# Patient Record
Sex: FEMALE | Race: WHITE | NOT HISPANIC OR LATINO | Employment: OTHER | URBAN - METROPOLITAN AREA
[De-identification: names, ages, dates, MRNs, and addresses within clinical notes are randomized per-mention and may not be internally consistent; named-entity substitution may affect disease eponyms.]

---

## 2017-06-07 NOTE — PROCEDURE NOTE: CLINICAL
PROCEDURE NOTE: Lucentis 0.5mg PFS OD. Diagnosis: Neovascular AMD with Active CNV. Anesthesia: Topical. Prep: Betadine Flush. Prior to injection, risks/benefits/alternatives discussed including but not limited to infection, loss of vision or eye, hemorrhage, cataract, glaucoma, retinal tears or detachment. The patient wished to proceed with treatment. Topical anesthesia was induced with Alcaine. Additional anesthesia was achieved using drop(s) or injection checked above. A drop of Povidone-iodine 5% ophthalmic solution was instilled over the injection site and in the inferior fornix. Betadine prep was performed. A single use prefilled syringe of intravitreal Lucentis 0.5mg/0.05ml was used and excess discarded. The needle was passed 3.0 mm posterior to the limbus in pseudophakic patients, and 3.5 mm posterior to the limbus in phakic patients. The remainder of the Lucentis 0.5mg in the single-use vial was then discarded in a medical waste disposal container. The eye was irrigated with sterile irrigating solution. Patient tolerated the procedure well. There were no complications. Post procedure instructions given. CF vision checked. Injection Time 2:50PM. The patient was instructed to return for re-evaluation in approximately 4-12 weeks depending on his/her condition and was told to call immediately if vision decreases and/or if his/her eye becomes red, painful, and/or light sensitive. The patient was instructed to go to the emergency room or call 911 if unable to reach the doctor within an hour or two of trying or calling. Raina Hong PROCEDURE NOTE: Lucentis 0.5mg PFS OS. Diagnosis: Neovascular AMD with Active CNV. Anesthesia: Topical. Prep: Betadine Flush. Prior to injection, risks/benefits/alternatives discussed including but not limited to infection, loss of vision or eye, hemorrhage, cataract, glaucoma, retinal tears or detachment. The patient wished to proceed with treatment. Topical anesthesia was induced with Alcaine. Additional anesthesia was achieved using drop(s) or injection checked above. A drop of Povidone-iodine 5% ophthalmic solution was instilled over the injection site and in the inferior fornix. Betadine prep was performed. A single use prefilled syringe of intravitreal Lucentis 0.5mg/0.05ml was used and excess discarded. The needle was passed 3.0 mm posterior to the limbus in pseudophakic patients, and 3.5 mm posterior to the limbus in phakic patients. The remainder of the Lucentis 0.5mg in the single-use vial was then discarded in a medical waste disposal container. The eye was irrigated with sterile irrigating solution. Patient tolerated the procedure well. There were no complications. Post procedure instructions given. CF vision checked. Injection Time 1:51PM. The patient was instructed to return for re-evaluation in approximately 4-12 weeks depending on his/her condition and was told to call immediately if vision decreases and/or if his/her eye becomes red, painful, and/or light sensitive. The patient was instructed to go to the emergency room or call 911 if unable to reach the doctor within an hour or two of trying or calling. Raina Hong

## 2017-08-30 NOTE — PROCEDURE NOTE: CLINICAL

## 2017-10-02 NOTE — PROCEDURE NOTE: CLINICAL

## 2017-11-13 NOTE — PROCEDURE NOTE: CLINICAL
"PROCEDURE NOTE: Lucentis 0.5mg PFS OD. Diagnosis: Neovascular AMD with Active CNV. Anesthesia: Topical/Subconjunctival. Prep: Betadine Flush. Prior to injection, risks/benefits/alternatives discussed including but not limited to infection, loss of vision or eye, hemorrhage, cataract, glaucoma, retinal tears or detachment. The patient wished to proceed with treatment. Topical anesthesia was induced with Alcaine. Additional anesthesia was achieved using drop(s) or injection checked above. A drop of Povidone-iodine 5% ophthalmic solution was instilled over the injection site and in the inferior fornix. Betadine prep was performed. A single use prefilled syringe of intravitreal Lucentis 0.5mg/0.05ml was used and excess discarded. The needle was passed 3.0 mm posterior to the limbus in pseudophakic patients, and 3.5 mm posterior to the limbus in phakic patients. The remainder of the Lucentis 0.5mg in the single-use vial was then discarded in a medical waste disposal container. The eye was irrigated with sterile irrigating solution. Patient tolerated the procedure well. There were no complications. Post procedure instructions given. CF vision checked. Injection Time 3"":12.  The patient was instructed to return for re-evaluation in approximately 4-12 weeks depending on his/her condition and was told to call immediately if vision decreases and/or if his/her eye becomes red

## 2017-12-13 NOTE — PROCEDURE NOTE: CLINICAL

## 2018-01-24 NOTE — PROCEDURE NOTE: CLINICAL
PROCEDURE NOTE: Lucentis 0.5mg PFS OD. Diagnosis: Neovascular AMD with Active CNV. Anesthesia: Topical. Prep: Betadine Flush. Prior to injection, risks/benefits/alternatives discussed including but not limited to infection, loss of vision or eye, hemorrhage, cataract, glaucoma, retinal tears or detachment. The patient wished to proceed with treatment. Topical anesthesia was induced with Alcaine. Additional anesthesia was achieved using drop(s) or injection checked above. A drop of Povidone-iodine 5% ophthalmic solution was instilled over the injection site and in the inferior fornix. Betadine prep was performed. A single use prefilled syringe of intravitreal Lucentis 0.5mg/0.05ml was used and excess discarded. The needle was passed 3.0 mm posterior to the limbus in pseudophakic patients, and 3.5 mm posterior to the limbus in phakic patients. The remainder of the Lucentis 0.5mg in the single-use vial was then discarded in a medical waste disposal container. The eye was irrigated with sterile irrigating solution. Patient tolerated the procedure well. There were no complications. Post procedure instructions given. CF vision checked. Injection Time 302. The patient was instructed to return for re-evaluation in approximately 4-12 weeks depending on his/her condition and was told to call immediately if vision decreases and/or if his/her eye becomes red, painful, and/or light sensitive. The patient was instructed to go to the emergency room or call 911 if unable to reach the doctor within an hour or two of trying or calling. Abhishek Joseph PROCEDURE NOTE: Lucentis 0.5mg PFS OS. Diagnosis: Neovascular AMD with Active CNV. Anesthesia: Topical. Prep: Betadine Flush. Prior to injection, risks/benefits/alternatives discussed including but not limited to infection, loss of vision or eye, hemorrhage, cataract, glaucoma, retinal tears or detachment. The patient wished to proceed with treatment. Topical anesthesia was induced with Alcaine. Additional anesthesia was achieved using drop(s) or injection checked above. A drop of Povidone-iodine 5% ophthalmic solution was instilled over the injection site and in the inferior fornix. Betadine prep was performed. A single use prefilled syringe of intravitreal Lucentis 0.5mg/0.05ml was used and excess discarded. The needle was passed 3.0 mm posterior to the limbus in pseudophakic patients, and 3.5 mm posterior to the limbus in phakic patients. The remainder of the Lucentis 0.5mg in the single-use vial was then discarded in a medical waste disposal container. The eye was irrigated with sterile irrigating solution. Patient tolerated the procedure well. There were no complications. Post procedure instructions given. CF vision checked. Injection Time 302. The patient was instructed to return for re-evaluation in approximately 4-12 weeks depending on his/her condition and was told to call immediately if vision decreases and/or if his/her eye becomes red, painful, and/or light sensitive. The patient was instructed to go to the emergency room or call 911 if unable to reach the doctor within an hour or two of trying or calling. Abhishek Joseph

## 2018-03-12 NOTE — PROCEDURE NOTE: CLINICAL

## 2018-04-17 NOTE — PROCEDURE NOTE: CLINICAL

## 2018-05-23 NOTE — PROCEDURE NOTE: CLINICAL

## 2018-07-02 NOTE — PROCEDURE NOTE: CLINICAL

## 2018-09-17 NOTE — PROCEDURE NOTE: CLINICAL
PROCEDURE NOTE: Lucentis 0.5mg PFS OD. Diagnosis: Neovascular AMD with Active CNV. Anesthesia: Topical. Prep: Betadine Flush. Prior to injection, risks/benefits/alternatives discussed including but not limited to infection, loss of vision or eye, hemorrhage, cataract, glaucoma, retinal tears or detachment. The patient wished to proceed with treatment. Topical anesthesia was induced with Alcaine. Additional anesthesia was achieved using drop(s) or injection checked above. A drop of Povidone-iodine 5% ophthalmic solution was instilled over the injection site and in the inferior fornix. Betadine prep was performed. A single use prefilled syringe of intravitreal Lucentis 0.5mg/0.05ml was used and excess discarded. The needle was passed 3.0 mm posterior to the limbus in pseudophakic patients, and 3.5 mm posterior to the limbus in phakic patients. The remainder of the Lucentis 0.5mg in the single-use vial was then discarded in a medical waste disposal container. The eye was irrigated with sterile irrigating solution. Patient tolerated the procedure well. There were no complications. Post procedure instructions given. CF vision checked. Injection Time 3:14. The patient was instructed to return for re-evaluation in approximately 4-12 weeks depending on his/her condition and was told to call immediately if vision decreases and/or if his/her eye becomes red, painful, and/or light sensitive. The patient was instructed to go to the emergency room or call 911 if unable to reach the doctor within an hour or two of trying or calling. Jennifer Magdaleno PROCEDURE NOTE: Lucentis 0.5mg PFS OS. Diagnosis: Neovascular AMD with Active CNV. Anesthesia: Topical. Prep: Betadine Flush. Prior to injection, risks/benefits/alternatives discussed including but not limited to infection, loss of vision or eye, hemorrhage, cataract, glaucoma, retinal tears or detachment. The patient wished to proceed with treatment. Topical anesthesia was induced with Alcaine. Additional anesthesia was achieved using drop(s) or injection checked above. A drop of Povidone-iodine 5% ophthalmic solution was instilled over the injection site and in the inferior fornix. Betadine prep was performed. A single use prefilled syringe of intravitreal Lucentis 0.5mg/0.05ml was used and excess discarded. The needle was passed 3.0 mm posterior to the limbus in pseudophakic patients, and 3.5 mm posterior to the limbus in phakic patients. The remainder of the Lucentis 0.5mg in the single-use vial was then discarded in a medical waste disposal container. The eye was irrigated with sterile irrigating solution. Patient tolerated the procedure well. There were no complications. Post procedure instructions given. CF vision checked. Injection Time *. The patient was instructed to return for re-evaluation in approximately 4-12 weeks depending on his/her condition and was told to call immediately if vision decreases and/or if his/her eye becomes red, painful, and/or light sensitive. The patient was instructed to go to the emergency room or call 911 if unable to reach the doctor within an hour or two of trying or calling. Jennifer Dolan

## 2018-11-07 NOTE — PROCEDURE NOTE: CLINICAL

## 2018-12-12 NOTE — PROCEDURE NOTE: CLINICAL
PROCEDURE NOTE: Lucentis 0.5mg PFS OD. Diagnosis: Neovascular AMD with Active CNV. Anesthesia: Lidocaine. Prep: Betadine Flush. Prior to injection, risks/benefits/alternatives discussed including but not limited to infection, loss of vision or eye, hemorrhage, cataract, glaucoma, retinal tears or detachment. The patient wished to proceed with treatment. Topical anesthesia was induced with Alcaine. Additional anesthesia was achieved using drop(s) or injection checked above. A drop of Povidone-iodine 5% ophthalmic solution was instilled over the injection site and in the inferior fornix. Betadine prep was performed. A single use prefilled syringe of intravitreal Lucentis 0.5mg/0.05ml was used and excess discarded. The needle was passed 3.0 mm posterior to the limbus in pseudophakic patients, and 3.5 mm posterior to the limbus in phakic patients. The remainder of the Lucentis 0.5mg in the single-use vial was then discarded in a medical waste disposal container. The eye was irrigated with sterile irrigating solution. Patient tolerated the procedure well. There were no complications. Post procedure instructions given. CF vision checked. Injection Time 2:36PM. The patient was instructed to return for re-evaluation in approximately 4-12 weeks depending on his/her condition and was told to call immediately if vision decreases and/or if his/her eye becomes red, painful, and/or light sensitive. The patient was instructed to go to the emergency room or call 911 if unable to reach the doctor within an hour or two of trying or calling. Shanae Hand PROCEDURE NOTE: Lucentis 0.5mg PFS OS. Diagnosis: Neovascular AMD with Active CNV. Anesthesia: Lidocaine. Prep: Betadine Flush. Prior to injection, risks/benefits/alternatives discussed including but not limited to infection, loss of vision or eye, hemorrhage, cataract, glaucoma, retinal tears or detachment. The patient wished to proceed with treatment. Topical anesthesia was induced with Alcaine. Additional anesthesia was achieved using drop(s) or injection checked above. A drop of Povidone-iodine 5% ophthalmic solution was instilled over the injection site and in the inferior fornix. Betadine prep was performed. A single use prefilled syringe of intravitreal Lucentis 0.5mg/0.05ml was used and excess discarded. The needle was passed 3.0 mm posterior to the limbus in pseudophakic patients, and 3.5 mm posterior to the limbus in phakic patients. The remainder of the Lucentis 0.5mg in the single-use vial was then discarded in a medical waste disposal container. The eye was irrigated with sterile irrigating solution. Patient tolerated the procedure well. There were no complications. Post procedure instructions given. CF vision checked. Injection Time 2:36PM. The patient was instructed to return for re-evaluation in approximately 4-12 weeks depending on his/her condition and was told to call immediately if vision decreases and/or if his/her eye becomes red, painful, and/or light sensitive. The patient was instructed to go to the emergency room or call 911 if unable to reach the doctor within an hour or two of trying or calling. Claude Gold
English

## 2018-12-31 NOTE — PATIENT DISCUSSION
Recommended weight and BMI control through healthy diet and exercise, green leafy veggies, UV protection, and not smoking. Reviewed the benefits of AREDS II VITAMINS VERUS GENOTYPE directed vitamin therapy, and recommended following one or the other to try and prevent the progression of the disease. Reviewed the importance of daily monitoring of the vision in each eye independently, along with the use of the Amsler grid daily and instructed patient to call and return immediately for any new changes in their vision or on the Amsler grid. Patient instructed on the importance of regular follow up and monitoring for the early detection of conversion to wet AMD as early detection results in early treatment and better outcomes. Pulse Duration (In Milliseconds): 38

## 2019-01-16 NOTE — PROCEDURE NOTE: CLINICAL
PROCEDURE NOTE: Lucentis 0.5mg PFS OD. Diagnosis: Neovascular AMD with Active CNV. Anesthesia: Lidocaine 4%. Prep: Betadine Flush. Prior to injection, risks/benefits/alternatives discussed including but not limited to infection, loss of vision or eye, hemorrhage, cataract, glaucoma, retinal tears or detachment. The patient wished to proceed with treatment. Topical anesthesia was induced with Alcaine. Additional anesthesia was achieved using drop(s) or injection checked above. A drop of Povidone-iodine 5% ophthalmic solution was instilled over the injection site and in the inferior fornix. Betadine prep was performed. A single use prefilled syringe of intravitreal Lucentis 0.5mg/0.05ml was used and excess discarded. The needle was passed 3.0 mm posterior to the limbus in pseudophakic patients, and 3.5 mm posterior to the limbus in phakic patients. The remainder of the Lucentis 0.5mg in the single-use vial was then discarded in a medical waste disposal container. The eye was irrigated with sterile irrigating solution. Patient tolerated the procedure well. There were no complications. Post procedure instructions given. CF vision checked. Injection Time 1:35PM. The patient was instructed to return for re-evaluation in approximately 4-12 weeks depending on his/her condition and was told to call immediately if vision decreases and/or if his/her eye becomes red, painful, and/or light sensitive. The patient was instructed to go to the emergency room or call 911 if unable to reach the doctor within an hour or two of trying or calling. Isabel Kidd PROCEDURE NOTE: Lucentis 0.5mg PFS OS. Diagnosis: Neovascular AMD with Active CNV. Anesthesia: Lidocaine 4%. Prep: Betadine Flush. Prior to injection, risks/benefits/alternatives discussed including but not limited to infection, loss of vision or eye, hemorrhage, cataract, glaucoma, retinal tears or detachment. The patient wished to proceed with treatment. Topical anesthesia was induced with Alcaine. Additional anesthesia was achieved using drop(s) or injection checked above. A drop of Povidone-iodine 5% ophthalmic solution was instilled over the injection site and in the inferior fornix. Betadine prep was performed. A single use prefilled syringe of intravitreal Lucentis 0.5mg/0.05ml was used and excess discarded. The needle was passed 3.0 mm posterior to the limbus in pseudophakic patients, and 3.5 mm posterior to the limbus in phakic patients. The remainder of the Lucentis 0.5mg in the single-use vial was then discarded in a medical waste disposal container. The eye was irrigated with sterile irrigating solution. Patient tolerated the procedure well. There were no complications. Post procedure instructions given. CF vision checked. Injection Time 1:34PM. The patient was instructed to return for re-evaluation in approximately 4-12 weeks depending on his/her condition and was told to call immediately if vision decreases and/or if his/her eye becomes red, painful, and/or light sensitive. The patient was instructed to go to the emergency room or call 911 if unable to reach the doctor within an hour or two of trying or calling. Isabel Kidd

## 2019-02-27 NOTE — PROCEDURE NOTE: CLINICAL
PROCEDURE NOTE: Lucentis 0.5mg PFS OD. Diagnosis: Neovascular AMD with Active CNV. Anesthesia: Lidocaine 4%. Prep: Betadine Flush. Prior to injection, risks/benefits/alternatives discussed including but not limited to infection, loss of vision or eye, hemorrhage, cataract, glaucoma, retinal tears or detachment. The patient wished to proceed with treatment. Topical anesthesia was induced with Alcaine. Additional anesthesia was achieved using drop(s) or injection checked above. A drop of Povidone-iodine 5% ophthalmic solution was instilled over the injection site and in the inferior fornix. Betadine prep was performed. A single use prefilled syringe of intravitreal Lucentis 0.5mg/0.05ml was used and excess discarded. The needle was passed 3.0 mm posterior to the limbus in pseudophakic patients, and 3.5 mm posterior to the limbus in phakic patients. The remainder of the Lucentis 0.5mg in the single-use vial was then discarded in a medical waste disposal container. The eye was irrigated with sterile irrigating solution. Patient tolerated the procedure well. There were no complications. Post procedure instructions given. CF vision checked. Injection Time 2:55M. The patient was instructed to return for re-evaluation in approximately 4-12 weeks depending on his/her condition and was told to call immediately if vision decreases and/or if his/her eye becomes red, painful, and/or light sensitive. The patient was instructed to go to the emergency room or call 911 if unable to reach the doctor within an hour or two of trying or calling. Justine Chadwick PROCEDURE NOTE: Lucentis 0.5mg PFS OS. Diagnosis: Neovascular AMD with Active CNV. Anesthesia: Lidocaine. Prep: Betadine Flush. Prior to injection, risks/benefits/alternatives discussed including but not limited to infection, loss of vision or eye, hemorrhage, cataract, glaucoma, retinal tears or detachment. The patient wished to proceed with treatment. Topical anesthesia was induced with Alcaine. Additional anesthesia was achieved using drop(s) or injection checked above. A drop of Povidone-iodine 5% ophthalmic solution was instilled over the injection site and in the inferior fornix. Betadine prep was performed. A single use prefilled syringe of intravitreal Lucentis 0.5mg/0.05ml was used and excess discarded. The needle was passed 3.0 mm posterior to the limbus in pseudophakic patients, and 3.5 mm posterior to the limbus in phakic patients. The remainder of the Lucentis 0.5mg in the single-use vial was then discarded in a medical waste disposal container. The eye was irrigated with sterile irrigating solution. Patient tolerated the procedure well. There were no complications. Post procedure instructions given. CF vision checked. Injection Time 2:54PM. The patient was instructed to return for re-evaluation in approximately 4-12 weeks depending on his/her condition and was told to call immediately if vision decreases and/or if his/her eye becomes red, painful, and/or light sensitive. The patient was instructed to go to the emergency room or call 911 if unable to reach the doctor within an hour or two of trying or calling. Justine Chadwick

## 2019-04-08 NOTE — PROCEDURE NOTE: CLINICAL
PROCEDURE NOTE: Lucentis 0.5mg PFS OD. Diagnosis: Neovascular AMD with Active CNV. Anesthesia: Lidocaine 4%. Prep: Betadine Flush. Prior to injection, risks/benefits/alternatives discussed including but not limited to infection, loss of vision or eye, hemorrhage, cataract, glaucoma, retinal tears or detachment. The patient wished to proceed with treatment. Topical anesthesia was induced with Alcaine. Additional anesthesia was achieved using drop(s) or injection checked above. A drop of Povidone-iodine 5% ophthalmic solution was instilled over the injection site and in the inferior fornix. Betadine prep was performed. A single use prefilled syringe of intravitreal Lucentis 0.5mg/0.05ml was used and excess discarded. The needle was passed 3.0 mm posterior to the limbus in pseudophakic patients, and 3.5 mm posterior to the limbus in phakic patients. The remainder of the Lucentis 0.5mg in the single-use vial was then discarded in a medical waste disposal container. The eye was irrigated with sterile irrigating solution. Patient tolerated the procedure well. There were no complications. Post procedure instructions given. CF vision checked. Injection Time 2:18PM. The patient was instructed to return for re-evaluation in approximately 4-12 weeks depending on his/her condition and was told to call immediately if vision decreases and/or if his/her eye becomes red, painful, and/or light sensitive. The patient was instructed to go to the emergency room or call 911 if unable to reach the doctor within an hour or two of trying or calling. Beronica Olsen PROCEDURE NOTE: Lucentis 0.5mg PFS OS. Diagnosis: Neovascular AMD with Active CNV. Anesthesia: Lidocaine 4%. Prep: Betadine Flush. Prior to injection, risks/benefits/alternatives discussed including but not limited to infection, loss of vision or eye, hemorrhage, cataract, glaucoma, retinal tears or detachment. The patient wished to proceed with treatment. Topical anesthesia was induced with Alcaine. Additional anesthesia was achieved using drop(s) or injection checked above. A drop of Povidone-iodine 5% ophthalmic solution was instilled over the injection site and in the inferior fornix. Betadine prep was performed. A single use prefilled syringe of intravitreal Lucentis 0.5mg/0.05ml was used and excess discarded. The needle was passed 3.0 mm posterior to the limbus in pseudophakic patients, and 3.5 mm posterior to the limbus in phakic patients. The remainder of the Lucentis 0.5mg in the single-use vial was then discarded in a medical waste disposal container. The eye was irrigated with sterile irrigating solution. Patient tolerated the procedure well. There were no complications. Post procedure instructions given. CF vision checked. Injection Time 2:27PM. The patient was instructed to return for re-evaluation in approximately 4-12 weeks depending on his/her condition and was told to call immediately if vision decreases and/or if his/her eye becomes red, painful, and/or light sensitive. The patient was instructed to go to the emergency room or call 911 if unable to reach the doctor within an hour or two of trying or calling. Beronica Olsen

## 2019-05-20 NOTE — PROCEDURE NOTE: CLINICAL

## 2019-06-24 NOTE — PROCEDURE NOTE: CLINICAL

## 2019-07-29 NOTE — PROCEDURE NOTE: CLINICAL

## 2019-09-07 NOTE — PATIENT DISCUSSION
Recommended OBSERVATION and MONITORING for change. B/R in [place] (later became [place]). Father  around 89, and pt was raised by her grandfather. 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 , daughter lives in [place]. Lives alone in [place]. Has a nephew in this area but does not want him involved in her care.

## 2019-09-17 NOTE — PROCEDURE NOTE: CLINICAL

## 2019-11-13 NOTE — PROCEDURE NOTE: CLINICAL

## 2019-12-04 ENCOUNTER — IMPORTED ENCOUNTER (OUTPATIENT)
Dept: URBAN - METROPOLITAN AREA CLINIC 31 | Facility: CLINIC | Age: 64
End: 2019-12-04

## 2019-12-04 PROBLEM — Z96.1: Noted: 2019-12-04

## 2019-12-04 PROBLEM — H40.1112: Noted: 2019-12-04

## 2019-12-04 PROBLEM — H40.1123: Noted: 2019-12-04

## 2019-12-04 PROBLEM — H18.603: Noted: 2019-12-04

## 2019-12-04 PROCEDURE — 92133 CPTRZD OPH DX IMG PST SGM ON: CPT

## 2019-12-04 PROCEDURE — 92004 COMPRE OPH EXAM NEW PT 1/>: CPT

## 2019-12-13 ENCOUNTER — IMPORTED ENCOUNTER (OUTPATIENT)
Dept: URBAN - METROPOLITAN AREA CLINIC 31 | Facility: CLINIC | Age: 64
End: 2019-12-13

## 2019-12-13 PROBLEM — H40.1112: Noted: 2019-12-13

## 2019-12-13 PROBLEM — H18.603: Noted: 2019-12-13

## 2019-12-13 PROBLEM — H40.1133: Noted: 2019-12-13

## 2019-12-13 PROBLEM — H40.1123: Noted: 2019-12-13

## 2019-12-13 PROBLEM — Z96.1: Noted: 2019-12-13

## 2019-12-13 PROCEDURE — 99213 OFFICE O/P EST LOW 20 MIN: CPT

## 2019-12-16 NOTE — PROCEDURE NOTE: CLINICAL

## 2019-12-16 NOTE — PATIENT DISCUSSION
My findings and recommendations are based on patient's symptoms, eye exam, diagnostic testing, and records. [Patient] : patient

## 2019-12-18 ENCOUNTER — IMPORTED ENCOUNTER (OUTPATIENT)
Dept: URBAN - METROPOLITAN AREA CLINIC 31 | Facility: CLINIC | Age: 64
End: 2019-12-18

## 2019-12-18 PROBLEM — Z98.89: Noted: 2019-12-18

## 2019-12-18 PROCEDURE — 99024 POSTOP FOLLOW-UP VISIT: CPT

## 2019-12-24 ENCOUNTER — IMPORTED ENCOUNTER (OUTPATIENT)
Dept: URBAN - METROPOLITAN AREA CLINIC 31 | Facility: CLINIC | Age: 64
End: 2019-12-24

## 2019-12-24 PROBLEM — Z98.89: Noted: 2019-12-24

## 2019-12-24 PROCEDURE — 99024 POSTOP FOLLOW-UP VISIT: CPT

## 2019-12-30 ENCOUNTER — IMPORTED ENCOUNTER (OUTPATIENT)
Dept: URBAN - METROPOLITAN AREA CLINIC 31 | Facility: CLINIC | Age: 64
End: 2019-12-30

## 2019-12-30 PROBLEM — Z98.89: Noted: 2019-12-30

## 2019-12-30 PROCEDURE — 99024 POSTOP FOLLOW-UP VISIT: CPT

## 2020-01-06 ENCOUNTER — IMPORTED ENCOUNTER (OUTPATIENT)
Dept: URBAN - METROPOLITAN AREA CLINIC 31 | Facility: CLINIC | Age: 65
End: 2020-01-06

## 2020-01-06 PROBLEM — Z98.89: Noted: 2020-01-06

## 2020-01-06 PROBLEM — H20.011: Noted: 2020-01-06

## 2020-01-06 PROCEDURE — 99024 POSTOP FOLLOW-UP VISIT: CPT

## 2020-01-17 ENCOUNTER — IMPORTED ENCOUNTER (OUTPATIENT)
Dept: URBAN - METROPOLITAN AREA CLINIC 31 | Facility: CLINIC | Age: 65
End: 2020-01-17

## 2020-01-17 PROBLEM — Z98.89: Noted: 2020-01-17

## 2020-01-17 PROCEDURE — 99024 POSTOP FOLLOW-UP VISIT: CPT

## 2020-01-17 PROCEDURE — 99080 SPECIAL REPORTS OR FORMS: CPT

## 2020-01-27 NOTE — PROCEDURE NOTE: CLINICAL
PROCEDURE NOTE: Lucentis 0.5mg PFS OD. Diagnosis: Neovascular AMD with Active CNV. Prior to injection, risks/benefits/alternatives discussed including but not limited to infection, loss of vision or eye, hemorrhage, cataract, glaucoma, retinal tears or detachment. The patient wished to proceed with treatment. Topical anesthesia was induced with Alcaine. Additional anesthesia was achieved using drop(s) or injection checked above. A drop of Povidone-iodine 5% ophthalmic solution was instilled over the injection site and in the inferior fornix. Betadine prep was performed. A single use prefilled syringe of intravitreal Lucentis 0.5mg/0.05ml was used and excess was disposed of as waste. The needle was passed 3.0 mm posterior to the limbus in pseudophakic patients, and 3.5 mm posterior to the limbus in phakic patients. Injection Time 229. Patient tolerated the procedure well. There were no complications. The eye was irrigated with sterile irrigating solution. Post procedure instructions given. The patient was instructed to use Artificial Tears q.i.d. p.r.n for comfort. The patient was instructed to return for re-evaluation in approximately 4-12 weeks depending on his/her condition and was told to call immediately if vision decreases and/or if his/her eye becomes red, painful, and/or light sensitive. The patient was instructed to go to the emergency room or call 911 if unable to reach the doctor within an hour or two of trying or calling. Jackie Zepeda PROCEDURE NOTE: Lucentis 0.5mg PFS OS. Diagnosis: Neovascular AMD with Active CNV. Prior to injection, risks/benefits/alternatives discussed including but not limited to infection, loss of vision or eye, hemorrhage, cataract, glaucoma, retinal tears or detachment. The patient wished to proceed with treatment. Topical anesthesia was induced with Alcaine. Additional anesthesia was achieved using drop(s) or injection checked above. A drop of Povidone-iodine 5% ophthalmic solution was instilled over the injection site and in the inferior fornix. Betadine prep was performed. A single use prefilled syringe of intravitreal Lucentis 0.5mg/0.05ml was used and excess was disposed of as waste. The needle was passed 3.0 mm posterior to the limbus in pseudophakic patients, and 3.5 mm posterior to the limbus in phakic patients. Injection Time 226. Patient tolerated the procedure well. There were no complications. The eye was irrigated with sterile irrigating solution. Post procedure instructions given. The patient was instructed to use Artificial Tears q.i.d. p.r.n for comfort. The patient was instructed to return for re-evaluation in approximately 4-12 weeks depending on his/her condition and was told to call immediately if vision decreases and/or if his/her eye becomes red, painful, and/or light sensitive. The patient was instructed to go to the emergency room or call 911 if unable to reach the doctor within an hour or two of trying or calling. Jackie Zepeda

## 2020-01-29 ENCOUNTER — IMPORTED ENCOUNTER (OUTPATIENT)
Dept: URBAN - METROPOLITAN AREA CLINIC 31 | Facility: CLINIC | Age: 65
End: 2020-01-29

## 2020-01-29 PROBLEM — Z98.89: Noted: 2020-01-29

## 2020-01-29 PROCEDURE — 99024 POSTOP FOLLOW-UP VISIT: CPT

## 2020-01-29 PROCEDURE — 92025 CPTRIZED CORNEAL TOPOGRAPHY: CPT

## 2020-02-12 ENCOUNTER — IMPORTED ENCOUNTER (OUTPATIENT)
Dept: URBAN - METROPOLITAN AREA CLINIC 31 | Facility: CLINIC | Age: 65
End: 2020-02-12

## 2020-02-12 PROBLEM — Z98.89: Noted: 2020-02-12

## 2020-02-12 PROCEDURE — 99024 POSTOP FOLLOW-UP VISIT: CPT

## 2020-03-13 ENCOUNTER — IMPORTED ENCOUNTER (OUTPATIENT)
Dept: URBAN - METROPOLITAN AREA CLINIC 31 | Facility: CLINIC | Age: 65
End: 2020-03-13

## 2020-03-13 PROBLEM — H20.021: Noted: 2020-03-13

## 2020-03-13 PROBLEM — H40.1112: Noted: 2020-03-13

## 2020-03-13 PROBLEM — H18.59: Noted: 2020-03-13

## 2020-03-13 PROBLEM — H40.1123: Noted: 2020-03-13

## 2020-03-13 PROBLEM — Z98.89: Noted: 2020-03-13

## 2020-03-13 PROBLEM — H18.603: Noted: 2020-03-13

## 2020-03-13 PROCEDURE — 99024 POSTOP FOLLOW-UP VISIT: CPT

## 2020-04-22 NOTE — PROCEDURE NOTE: CLINICAL

## 2020-04-27 ENCOUNTER — IMPORTED ENCOUNTER (OUTPATIENT)
Dept: URBAN - METROPOLITAN AREA CLINIC 31 | Facility: CLINIC | Age: 65
End: 2020-04-27

## 2020-04-27 PROBLEM — H16.011: Noted: 2020-04-27

## 2020-04-27 PROBLEM — H18.603: Noted: 2020-04-27

## 2020-04-27 PROBLEM — H10.501: Noted: 2020-04-27

## 2020-04-27 PROBLEM — H40.1132: Noted: 2020-04-27

## 2020-04-27 PROBLEM — H20.11: Noted: 2020-04-27

## 2020-04-27 PROCEDURE — 92012 INTRM OPH EXAM EST PATIENT: CPT

## 2020-04-29 ENCOUNTER — IMPORTED ENCOUNTER (OUTPATIENT)
Dept: URBAN - METROPOLITAN AREA CLINIC 31 | Facility: CLINIC | Age: 65
End: 2020-04-29

## 2020-04-29 PROBLEM — H16.011: Noted: 2020-04-29

## 2020-04-29 PROBLEM — H10.501: Noted: 2020-04-29

## 2020-04-29 PROBLEM — H40.1132: Noted: 2020-04-29

## 2020-04-29 PROBLEM — H18.603: Noted: 2020-04-29

## 2020-04-29 PROBLEM — H20.11: Noted: 2020-04-29

## 2020-04-29 PROCEDURE — 99213 OFFICE O/P EST LOW 20 MIN: CPT

## 2020-05-01 ENCOUNTER — IMPORTED ENCOUNTER (OUTPATIENT)
Dept: URBAN - METROPOLITAN AREA CLINIC 31 | Facility: CLINIC | Age: 65
End: 2020-05-01

## 2020-05-01 PROBLEM — H18.603: Noted: 2020-05-01

## 2020-05-01 PROBLEM — H40.1132: Noted: 2020-05-01

## 2020-05-01 PROBLEM — H16.011: Noted: 2020-05-01

## 2020-05-01 PROBLEM — H20.11: Noted: 2020-05-01

## 2020-05-01 PROCEDURE — 92012 INTRM OPH EXAM EST PATIENT: CPT

## 2020-05-04 ENCOUNTER — IMPORTED ENCOUNTER (OUTPATIENT)
Dept: URBAN - METROPOLITAN AREA CLINIC 31 | Facility: CLINIC | Age: 65
End: 2020-05-04

## 2020-05-04 PROBLEM — H16.011: Noted: 2020-05-04

## 2020-05-04 PROBLEM — H18.603: Noted: 2020-05-04

## 2020-05-04 PROBLEM — H20.11: Noted: 2020-05-04

## 2020-05-04 PROBLEM — H40.1132: Noted: 2020-05-04

## 2020-05-04 PROCEDURE — 99213 OFFICE O/P EST LOW 20 MIN: CPT

## 2020-05-08 ENCOUNTER — IMPORTED ENCOUNTER (OUTPATIENT)
Dept: URBAN - METROPOLITAN AREA CLINIC 31 | Facility: CLINIC | Age: 65
End: 2020-05-08

## 2020-05-08 PROBLEM — H40.1132: Noted: 2020-05-08

## 2020-05-08 PROBLEM — H20.11: Noted: 2020-05-08

## 2020-05-08 PROBLEM — H18.603: Noted: 2020-05-08

## 2020-05-08 PROBLEM — H16.011: Noted: 2020-05-08

## 2020-05-08 PROCEDURE — 92012 INTRM OPH EXAM EST PATIENT: CPT

## 2020-05-11 ENCOUNTER — IMPORTED ENCOUNTER (OUTPATIENT)
Dept: URBAN - METROPOLITAN AREA CLINIC 31 | Facility: CLINIC | Age: 65
End: 2020-05-11

## 2020-05-11 PROBLEM — H16.011: Noted: 2020-05-11

## 2020-05-11 PROBLEM — H40.1132: Noted: 2020-05-11

## 2020-05-11 PROBLEM — H18.603: Noted: 2020-05-11

## 2020-05-11 PROBLEM — H20.11: Noted: 2020-05-11

## 2020-05-11 PROCEDURE — 92012 INTRM OPH EXAM EST PATIENT: CPT

## 2020-05-11 PROCEDURE — 65430 CORNEAL SMEAR: CPT

## 2020-05-13 ENCOUNTER — IMPORTED ENCOUNTER (OUTPATIENT)
Dept: URBAN - METROPOLITAN AREA CLINIC 31 | Facility: CLINIC | Age: 65
End: 2020-05-13

## 2020-05-13 PROBLEM — H16.011: Noted: 2020-05-13

## 2020-05-13 PROBLEM — H18.603: Noted: 2020-05-13

## 2020-05-13 PROBLEM — H20.11: Noted: 2020-05-13

## 2020-05-13 PROBLEM — H40.1132: Noted: 2020-05-13

## 2020-05-13 PROCEDURE — 99213 OFFICE O/P EST LOW 20 MIN: CPT

## 2020-05-18 ENCOUNTER — IMPORTED ENCOUNTER (OUTPATIENT)
Dept: URBAN - METROPOLITAN AREA CLINIC 31 | Facility: CLINIC | Age: 65
End: 2020-05-18

## 2020-05-18 PROBLEM — H16.011: Noted: 2020-05-18

## 2020-05-18 PROBLEM — H20.11: Noted: 2020-05-18

## 2020-05-18 PROBLEM — H40.1132: Noted: 2020-05-18

## 2020-05-18 PROBLEM — H18.603: Noted: 2020-05-18

## 2020-05-18 PROCEDURE — 99213 OFFICE O/P EST LOW 20 MIN: CPT

## 2020-05-22 ENCOUNTER — IMPORTED ENCOUNTER (OUTPATIENT)
Dept: URBAN - METROPOLITAN AREA CLINIC 31 | Facility: CLINIC | Age: 65
End: 2020-05-22

## 2020-05-22 PROBLEM — H16.011: Noted: 2020-05-22

## 2020-05-22 PROBLEM — H40.1132: Noted: 2020-05-22

## 2020-05-22 PROBLEM — H20.11: Noted: 2020-05-22

## 2020-05-22 PROBLEM — H18.603: Noted: 2020-05-22

## 2020-05-22 PROCEDURE — 99213 OFFICE O/P EST LOW 20 MIN: CPT

## 2020-05-26 ENCOUNTER — IMPORTED ENCOUNTER (OUTPATIENT)
Dept: URBAN - METROPOLITAN AREA CLINIC 31 | Facility: CLINIC | Age: 65
End: 2020-05-26

## 2020-05-26 PROBLEM — H16.011: Noted: 2020-05-26

## 2020-05-26 PROBLEM — H40.1132: Noted: 2020-05-26

## 2020-05-26 PROBLEM — H20.11: Noted: 2020-05-26

## 2020-05-26 PROBLEM — H18.603: Noted: 2020-05-26

## 2020-05-26 PROCEDURE — 99213 OFFICE O/P EST LOW 20 MIN: CPT

## 2020-06-08 ENCOUNTER — IMPORTED ENCOUNTER (OUTPATIENT)
Dept: URBAN - METROPOLITAN AREA CLINIC 31 | Facility: CLINIC | Age: 65
End: 2020-06-08

## 2020-06-08 PROBLEM — H20.11: Noted: 2020-06-08

## 2020-06-08 PROBLEM — H18.603: Noted: 2020-06-08

## 2020-06-08 PROBLEM — H40.1132: Noted: 2020-06-08

## 2020-06-08 PROBLEM — H17.11: Noted: 2020-06-08

## 2020-06-08 PROCEDURE — 99213 OFFICE O/P EST LOW 20 MIN: CPT

## 2020-06-08 PROCEDURE — 92025 CPTRIZED CORNEAL TOPOGRAPHY: CPT

## 2020-07-01 NOTE — PROCEDURE NOTE: CLINICAL
PROCEDURE NOTE: Lucentis 0.5mg PFS OD. Diagnosis: Neovascular AMD with Active CNV. Anesthesia: Topical/Subconjunctival. Prep: Betadine Flush. Prior to injection, risks/benefits/alternatives discussed including but not limited to infection, loss of vision or eye, hemorrhage, cataract, glaucoma, retinal tears or detachment. The patient wished to proceed with treatment. Topical anesthesia was induced with Alcaine. Additional anesthesia was achieved using drop(s) or injection checked above. A drop of Povidone-iodine 5% ophthalmic solution was instilled over the injection site and in the inferior fornix. Betadine prep was performed. A single use prefilled syringe of intravitreal Lucentis 0.5mg/0.05ml was used and excess was disposed of as waste. The needle was passed 3.0 mm posterior to the limbus in pseudophakic patients, and 3.5 mm posterior to the limbus in phakic patients. Injection Time *. Patient tolerated the procedure well. There were no complications. The eye was irrigated with sterile irrigating solution. Post procedure instructions given. The patient was instructed to use Artificial Tears q.i.d. p.r.n for comfort. The patient was instructed to return for re-evaluation in approximately 4-12 weeks depending on his/her condition and was told to call immediately if vision decreases and/or if his/her eye becomes red, painful, and/or light sensitive. The patient was instructed to go to the emergency room or call 911 if unable to reach the doctor within an hour or two of trying or calling. Talya Beebe PROCEDURE NOTE: Lucentis 0.5mg PFS OS. Diagnosis: Neovascular AMD with Active CNV. Anesthesia: Topical/Subconjunctival. Prep: Betadine Flush. Prior to injection, risks/benefits/alternatives discussed including but not limited to infection, loss of vision or eye, hemorrhage, cataract, glaucoma, retinal tears or detachment. The patient wished to proceed with treatment. Topical anesthesia was induced with Alcaine. Additional anesthesia was achieved using drop(s) or injection checked above. A drop of Povidone-iodine 5% ophthalmic solution was instilled over the injection site and in the inferior fornix. Betadine prep was performed. A single use prefilled syringe of intravitreal Lucentis 0.5mg/0.05ml was used and excess was disposed of as waste. The needle was passed 3.0 mm posterior to the limbus in pseudophakic patients, and 3.5 mm posterior to the limbus in phakic patients. Injection Time *. Patient tolerated the procedure well. There were no complications. The eye was irrigated with sterile irrigating solution. Post procedure instructions given. The patient was instructed to use Artificial Tears q.i.d. p.r.n for comfort. The patient was instructed to return for re-evaluation in approximately 4-12 weeks depending on his/her condition and was told to call immediately if vision decreases and/or if his/her eye becomes red, painful, and/or light sensitive. The patient was instructed to go to the emergency room or call 911 if unable to reach the doctor within an hour or two of trying or calling. Talya Beebe

## 2020-07-21 ENCOUNTER — IMPORTED ENCOUNTER (OUTPATIENT)
Dept: URBAN - METROPOLITAN AREA CLINIC 31 | Facility: CLINIC | Age: 65
End: 2020-07-21

## 2020-07-21 PROBLEM — Z96.1: Noted: 2020-07-21

## 2020-07-21 PROBLEM — H40.1132: Noted: 2020-07-21

## 2020-07-21 PROBLEM — H20.11: Noted: 2020-07-21

## 2020-07-21 PROBLEM — H17.11: Noted: 2020-07-21

## 2020-07-21 PROCEDURE — 99213 OFFICE O/P EST LOW 20 MIN: CPT

## 2020-10-28 ENCOUNTER — IMPORTED ENCOUNTER (OUTPATIENT)
Dept: URBAN - METROPOLITAN AREA CLINIC 31 | Facility: CLINIC | Age: 65
End: 2020-10-28

## 2020-10-28 PROBLEM — H18.603: Noted: 2020-10-28

## 2020-10-28 PROBLEM — H20.11: Noted: 2020-10-28

## 2020-10-28 PROBLEM — H40.1112: Noted: 2020-10-28

## 2020-10-28 PROBLEM — Z96.1: Noted: 2020-10-28

## 2020-10-28 PROBLEM — H17.11: Noted: 2020-10-28

## 2020-10-28 PROBLEM — H40.1123: Noted: 2020-10-28

## 2020-10-28 PROCEDURE — 92083 EXTENDED VISUAL FIELD XM: CPT

## 2020-10-28 PROCEDURE — 99213 OFFICE O/P EST LOW 20 MIN: CPT

## 2020-11-11 ENCOUNTER — IMPORTED ENCOUNTER (OUTPATIENT)
Dept: URBAN - METROPOLITAN AREA CLINIC 31 | Facility: CLINIC | Age: 65
End: 2020-11-11

## 2020-11-11 PROBLEM — H17.11: Noted: 2020-11-11

## 2020-11-11 PROBLEM — H40.1112: Noted: 2020-11-11

## 2020-11-11 PROBLEM — Z96.1: Noted: 2020-11-11

## 2020-11-11 PROBLEM — H40.1123: Noted: 2020-11-11

## 2020-11-11 PROBLEM — H18.603: Noted: 2020-11-11

## 2020-11-11 PROCEDURE — 92012 INTRM OPH EXAM EST PATIENT: CPT

## 2020-11-25 ENCOUNTER — IMPORTED ENCOUNTER (OUTPATIENT)
Dept: URBAN - METROPOLITAN AREA CLINIC 31 | Facility: CLINIC | Age: 65
End: 2020-11-25

## 2020-11-25 PROBLEM — Z98.89: Noted: 2020-11-25

## 2020-11-25 PROCEDURE — 99024 POSTOP FOLLOW-UP VISIT: CPT

## 2020-11-30 ENCOUNTER — IMPORTED ENCOUNTER (OUTPATIENT)
Dept: URBAN - METROPOLITAN AREA CLINIC 31 | Facility: CLINIC | Age: 65
End: 2020-11-30

## 2020-11-30 PROBLEM — Z98.89: Noted: 2020-11-30

## 2020-11-30 PROCEDURE — 99024 POSTOP FOLLOW-UP VISIT: CPT

## 2020-12-01 ENCOUNTER — IMPORTED ENCOUNTER (OUTPATIENT)
Dept: URBAN - METROPOLITAN AREA CLINIC 31 | Facility: CLINIC | Age: 65
End: 2020-12-01

## 2020-12-01 PROBLEM — Z98.89: Noted: 2020-12-01

## 2020-12-01 PROCEDURE — 99024 POSTOP FOLLOW-UP VISIT: CPT

## 2020-12-07 ENCOUNTER — IMPORTED ENCOUNTER (OUTPATIENT)
Dept: URBAN - METROPOLITAN AREA CLINIC 31 | Facility: CLINIC | Age: 65
End: 2020-12-07

## 2020-12-07 PROCEDURE — 99024 POSTOP FOLLOW-UP VISIT: CPT

## 2020-12-14 ENCOUNTER — IMPORTED ENCOUNTER (OUTPATIENT)
Dept: URBAN - METROPOLITAN AREA CLINIC 31 | Facility: CLINIC | Age: 65
End: 2020-12-14

## 2020-12-14 PROBLEM — Z98.89: Noted: 2020-12-14

## 2020-12-14 PROCEDURE — 99024 POSTOP FOLLOW-UP VISIT: CPT

## 2020-12-21 ENCOUNTER — IMPORTED ENCOUNTER (OUTPATIENT)
Dept: URBAN - METROPOLITAN AREA CLINIC 31 | Facility: CLINIC | Age: 65
End: 2020-12-21

## 2020-12-21 PROBLEM — Z98.89: Noted: 2020-12-21

## 2020-12-21 PROCEDURE — 99024 POSTOP FOLLOW-UP VISIT: CPT

## 2021-01-05 ENCOUNTER — IMPORTED ENCOUNTER (OUTPATIENT)
Dept: URBAN - METROPOLITAN AREA CLINIC 31 | Facility: CLINIC | Age: 66
End: 2021-01-05

## 2021-01-05 PROBLEM — Z98.89: Noted: 2021-01-05

## 2021-01-05 PROCEDURE — 99024 POSTOP FOLLOW-UP VISIT: CPT

## 2021-01-08 ENCOUNTER — IMPORTED ENCOUNTER (OUTPATIENT)
Dept: URBAN - METROPOLITAN AREA CLINIC 31 | Facility: CLINIC | Age: 66
End: 2021-01-08

## 2021-01-08 PROBLEM — Z98.89: Noted: 2021-01-08

## 2021-01-08 PROCEDURE — 99024 POSTOP FOLLOW-UP VISIT: CPT

## 2021-02-09 ENCOUNTER — IMPORTED ENCOUNTER (OUTPATIENT)
Dept: URBAN - METROPOLITAN AREA CLINIC 31 | Facility: CLINIC | Age: 66
End: 2021-02-09

## 2021-02-09 PROBLEM — Z98.89: Noted: 2021-02-09

## 2021-02-09 PROCEDURE — 99024 POSTOP FOLLOW-UP VISIT: CPT

## 2021-05-21 ENCOUNTER — IMPORTED ENCOUNTER (OUTPATIENT)
Dept: URBAN - METROPOLITAN AREA CLINIC 31 | Facility: CLINIC | Age: 66
End: 2021-05-21

## 2021-05-21 PROBLEM — H18.603: Noted: 2021-05-21

## 2021-05-21 PROBLEM — H40.1132: Noted: 2021-05-21

## 2021-05-21 PROBLEM — Z96.1: Noted: 2021-05-21

## 2021-05-21 PROBLEM — H20.13: Noted: 2021-05-21

## 2021-05-21 PROCEDURE — 92083 EXTENDED VISUAL FIELD XM: CPT

## 2021-05-21 PROCEDURE — 99213 OFFICE O/P EST LOW 20 MIN: CPT

## 2021-10-22 ENCOUNTER — TELEPHONE ENCOUNTER (OUTPATIENT)
Dept: URBAN - METROPOLITAN AREA CLINIC 9 | Facility: CLINIC | Age: 66
End: 2021-10-22

## 2021-10-22 ENCOUNTER — OFFICE VISIT (OUTPATIENT)
Age: 66
End: 2021-10-22

## 2021-11-09 ENCOUNTER — IMPORTED ENCOUNTER (OUTPATIENT)
Dept: URBAN - METROPOLITAN AREA CLINIC 31 | Facility: CLINIC | Age: 66
End: 2021-11-09

## 2021-11-09 PROBLEM — Z96.1: Noted: 2021-11-09

## 2021-11-09 PROBLEM — H40.1132: Noted: 2021-11-09

## 2021-11-09 PROBLEM — H20.13: Noted: 2021-11-09

## 2021-11-09 PROBLEM — H18.603: Noted: 2021-11-09

## 2021-11-09 PROBLEM — H40.1112: Noted: 2021-11-09

## 2021-11-09 PROCEDURE — 99214 OFFICE O/P EST MOD 30 MIN: CPT

## 2021-11-09 PROCEDURE — 92133 CPTRZD OPH DX IMG PST SGM ON: CPT

## 2021-12-07 ENCOUNTER — OFFICE VISIT (OUTPATIENT)
Dept: URBAN - METROPOLITAN AREA SURGERY CENTER 5 | Facility: SURGERY CENTER | Age: 66
End: 2021-12-07

## 2021-12-28 ENCOUNTER — OFFICE VISIT (OUTPATIENT)
Dept: URBAN - METROPOLITAN AREA SURGERY CENTER 5 | Facility: SURGERY CENTER | Age: 66
End: 2021-12-28

## 2022-04-02 ASSESSMENT — VISUAL ACUITY
OS_GLARE: 20/40
OD_CC: 20/80-1
OS_SC: 20/60-2
OD_CC: 20/70-1
OS_CC: 20/40
OD_CC: 20/200
OD_CC: 20/200
OS_SC: 20/30-2
OD_PH: SC 20/80
OS_PH: SC 20/80 -1
OS_CC: 20/80
OD_CC: 20/400
OD_SC: 20/25-2
OS_SC: 20/30-2
OD_CC: 20/80-2
OS_PH: SC 20/50
OS_GLARE: 20/30-2
OS_SC: 20/50
OS_SC: 20/40-2
OS_SC: 20/40+2
OS_PH: SC 20/30 -2
OS_PH: CC 20/30 -1
OS_GLARE: 20/40
OD_CC: 20/50-2
OD_CC: 20/100
OD_CC: 20/100
OD_CC: 20/400
OD_SC: 20/25
OD_CC: 20/80
OS_CC: 20/100
OD_PH: SC 20/60 -2
OD_SC: 20/25-1
OD_CC: 20/200
OD_CC: 20/60+1
OD_SC: 20/25
OS_PH: SC 20/40
OS_CC: 20/80
OS_PH: SC 20/30 -2
OD_SC: 20/25
OS_SC: 20/40
OS_SC: 20/30-2
OS_SC: 20/40
OS_SC: 20/40
OD_CC: 20/80-1
OD_CC: 20/80+1
OS_PH: CC 20/30
OD_GLARE: 20/60-1
OS_SC: 20/40
OS_CC: 20/50
OS_CC: 20/100-2
OD_CC: 20/80
OS_SC: 20/30-2
OS_CC: 20/40
OS_SC: 20/50-1
OS_SC: 20/40-2
OD_CC: 20/400
OD_CC: 20/80
OS_PH: SC 20/60
OD_CC: 20/80-2
OD_CC: 20/60-1
OD_PH: SC 20/50 -2
OD_PH: SC 20/80
OD_CC: 20/80
OD_GLARE: 20/60
OS_CC: 20/80
OD_CC: 20/80
OS_SC: 20/40-2
OD_PH: SC 20/70
OS_SC: 20/40
OD_PH: SC 20/50
OS_GLARE: 20/40-2
OS_PH: SC 20/70
OD_SC: 20/20-3
OD_PH: SC 20/60 -1
OS_GLARE: 20/80
OS_CC: 20/200
OS_PH: SC 20/40
OD_SC: 20/30
OD_SC: 20/30
OD_GLARE: 20/60-1
OD_PH: SC 20/30 -1
OD_CC: 20/50
OS_GLARE: 20/70
OD_CC: 20/200
OD_PH: SC 20/70
OD_CC: 20/200
OD_PH: SC 20/60
OS_PH: CC 20/30 -2
OD_PH: SC 20/50 -2
OD_CC: 20/100
OS_SC: 20/25-2
OD_PH: SC 20/60 -1
OS_CC: 20/60-1
OS_CC: 20/80-2
OS_SC: 20/40-2
OS_SC: 20/50
OS_CC: 20/50-1
OS_PH: SC 20/80
OS_SC: 20/80-2
OS_SC: 20/40+2
OS_SC: 20/40
OD_SC: 20/25-2

## 2022-04-02 ASSESSMENT — TONOMETRY
OS_IOP_MMHG: 25
OS_IOP_MMHG: 17
OD_IOP_MMHG: 13
OS_IOP_MMHG: 14
OD_IOP_MMHG: 11
OD_IOP_MMHG: 8
OD_IOP_MMHG: 10
OS_IOP_MMHG: 18
OS_IOP_MMHG: 17
OD_IOP_MMHG: 16
OD_IOP_MMHG: 1
OS_IOP_MMHG: 22
OS_IOP_MMHG: 14
OD_IOP_MMHG: 1
OS_IOP_MMHG: 12
OD_IOP_MMHG: 7
OD_IOP_MMHG: 1
OS_IOP_MMHG: 13
OD_IOP_MMHG: 10
OS_IOP_MMHG: 16
OD_IOP_MMHG: 13
OD_IOP_MMHG: 11
OS_IOP_MMHG: 22
OD_IOP_MMHG: 12
OS_IOP_MMHG: 19
OD_IOP_MMHG: 38
OD_IOP_MMHG: 1
OD_IOP_MMHG: 16
OD_IOP_MMHG: 6
OS_IOP_MMHG: 13
OS_IOP_MMHG: 14
OS_IOP_MMHG: 16
OS_IOP_MMHG: 15
OD_IOP_MMHG: 13
OD_IOP_MMHG: 38
OD_IOP_MMHG: 18
OS_IOP_MMHG: 12
OS_IOP_MMHG: 17
OD_IOP_MMHG: 3
OS_IOP_MMHG: 14
OS_IOP_MMHG: 3
OD_IOP_MMHG: 15
OS_IOP_MMHG: 6

## 2022-04-13 NOTE — PATIENT DISCUSSION
POST INJECTION EVALUATION, no signs of new infection, tear, RD, VF, EOM, CNS, Vascular or other problems or side effect from previous injection(s). Tranexamic Acid Counseling:  Patient advised of the small risk of bleeding problems with tranexamic acid. They were also instructed to call if they developed any nausea, vomiting or diarrhea. All of the patient's questions and concerns were addressed.

## 2022-05-11 ENCOUNTER — FOLLOW UP (OUTPATIENT)
Dept: URBAN - METROPOLITAN AREA CLINIC 29 | Facility: CLINIC | Age: 67
End: 2022-05-11

## 2022-05-11 DIAGNOSIS — Z96.1: ICD-10-CM

## 2022-05-11 DIAGNOSIS — H20.13: ICD-10-CM

## 2022-05-11 DIAGNOSIS — H18.603: ICD-10-CM

## 2022-05-11 DIAGNOSIS — H40.1132: ICD-10-CM

## 2022-05-11 PROCEDURE — 92083 EXTENDED VISUAL FIELD XM: CPT

## 2022-05-11 PROCEDURE — 99213 OFFICE O/P EST LOW 20 MIN: CPT

## 2022-05-11 ASSESSMENT — VISUAL ACUITY
OS_CC: 20/50
OD_CC: 20/25
OS_PH: 20/40
OD_PH: 20/25+2

## 2022-05-11 ASSESSMENT — TONOMETRY
OS_IOP_MMHG: 16
OD_IOP_MMHG: 10

## 2022-07-30 ENCOUNTER — TELEPHONE ENCOUNTER (OUTPATIENT)
Age: 67
End: 2022-07-30

## 2022-07-31 ENCOUNTER — TELEPHONE ENCOUNTER (OUTPATIENT)
Age: 67
End: 2022-07-31

## 2022-11-09 ENCOUNTER — ESTABLISHED PATIENT (OUTPATIENT)
Dept: URBAN - METROPOLITAN AREA CLINIC 29 | Facility: CLINIC | Age: 67
End: 2022-11-09

## 2022-11-09 DIAGNOSIS — H40.1132: ICD-10-CM

## 2022-11-09 DIAGNOSIS — H18.603: ICD-10-CM

## 2022-11-09 DIAGNOSIS — Z96.1: ICD-10-CM

## 2022-11-09 DIAGNOSIS — H20.13: ICD-10-CM

## 2022-11-09 PROCEDURE — 92133 CPTRZD OPH DX IMG PST SGM ON: CPT

## 2022-11-09 PROCEDURE — 92014 COMPRE OPH EXAM EST PT 1/>: CPT

## 2022-11-09 ASSESSMENT — TONOMETRY
OS_IOP_MMHG: 14
OD_IOP_MMHG: 12

## 2022-11-09 ASSESSMENT — VISUAL ACUITY
OS_CC: 20/50
OD_CC: 20/30+2
OS_PH: 20/30

## 2023-05-12 ENCOUNTER — ESTABLISHED PATIENT (OUTPATIENT)
Dept: URBAN - METROPOLITAN AREA CLINIC 29 | Facility: CLINIC | Age: 68
End: 2023-05-12

## 2023-05-12 DIAGNOSIS — H17.9: ICD-10-CM

## 2023-05-12 DIAGNOSIS — H18.603: ICD-10-CM

## 2023-05-12 DIAGNOSIS — Z96.1: ICD-10-CM

## 2023-05-12 DIAGNOSIS — H40.1132: ICD-10-CM

## 2023-05-12 DIAGNOSIS — H20.13: ICD-10-CM

## 2023-05-12 PROCEDURE — 92083 EXTENDED VISUAL FIELD XM: CPT

## 2023-05-12 PROCEDURE — 92012 INTRM OPH EXAM EST PATIENT: CPT

## 2023-05-12 ASSESSMENT — TONOMETRY
OD_IOP_MMHG: 10
OS_IOP_MMHG: 12

## 2023-05-12 ASSESSMENT — VISUAL ACUITY
OD_CC: 20/30+2
OS_PH: 20/40
OS_CC: 20/80

## 2023-12-04 ENCOUNTER — COMPREHENSIVE EXAM (OUTPATIENT)
Dept: URBAN - METROPOLITAN AREA CLINIC 29 | Facility: CLINIC | Age: 68
End: 2023-12-04

## 2023-12-04 DIAGNOSIS — H40.1112: ICD-10-CM

## 2023-12-04 DIAGNOSIS — Z96.1: ICD-10-CM

## 2023-12-04 DIAGNOSIS — H17.9: ICD-10-CM

## 2023-12-04 DIAGNOSIS — H18.613: ICD-10-CM

## 2023-12-04 DIAGNOSIS — H20.13: ICD-10-CM

## 2023-12-04 DIAGNOSIS — H40.1123: ICD-10-CM

## 2023-12-04 PROCEDURE — 92014 COMPRE OPH EXAM EST PT 1/>: CPT

## 2023-12-04 PROCEDURE — 92133 CPTRZD OPH DX IMG PST SGM ON: CPT

## 2023-12-04 ASSESSMENT — VISUAL ACUITY
OS_PH: 20/60
OD_CC: 20/25-2 CL

## 2023-12-04 ASSESSMENT — TONOMETRY
OS_IOP_MMHG: 17
OD_IOP_MMHG: 13

## 2024-03-05 ENCOUNTER — FOLLOW UP (OUTPATIENT)
Dept: URBAN - METROPOLITAN AREA CLINIC 29 | Facility: CLINIC | Age: 69
End: 2024-03-05

## 2024-03-05 DIAGNOSIS — H17.9: ICD-10-CM

## 2024-03-05 DIAGNOSIS — H40.1123: ICD-10-CM

## 2024-03-05 DIAGNOSIS — H20.13: ICD-10-CM

## 2024-03-05 DIAGNOSIS — H40.1112: ICD-10-CM

## 2024-03-05 DIAGNOSIS — Z96.1: ICD-10-CM

## 2024-03-05 DIAGNOSIS — H18.613: ICD-10-CM

## 2024-03-05 PROCEDURE — 99213 OFFICE O/P EST LOW 20 MIN: CPT

## 2024-03-05 ASSESSMENT — TONOMETRY
OS_IOP_MMHG: 18
OD_IOP_MMHG: 12

## 2024-03-05 ASSESSMENT — VISUAL ACUITY
OS_CC: 20/200
OD_CC: 20/25-2

## 2024-07-09 ENCOUNTER — FOLLOW UP (OUTPATIENT)
Dept: URBAN - METROPOLITAN AREA CLINIC 29 | Facility: CLINIC | Age: 69
End: 2024-07-09

## 2024-07-09 DIAGNOSIS — Z96.1: ICD-10-CM

## 2024-07-09 DIAGNOSIS — H40.1112: ICD-10-CM

## 2024-07-09 DIAGNOSIS — H40.1123: ICD-10-CM

## 2024-07-09 DIAGNOSIS — H18.613: ICD-10-CM

## 2024-07-09 DIAGNOSIS — H17.9: ICD-10-CM

## 2024-07-09 DIAGNOSIS — H20.13: ICD-10-CM

## 2024-07-09 PROCEDURE — 92083 EXTENDED VISUAL FIELD XM: CPT

## 2024-07-09 PROCEDURE — 99213 OFFICE O/P EST LOW 20 MIN: CPT

## 2024-07-09 ASSESSMENT — TONOMETRY: OD_IOP_MMHG: 10

## 2024-07-09 ASSESSMENT — VISUAL ACUITY
OS_SC: 20/300
OD_CC: 20/25

## 2024-08-06 ENCOUNTER — FOLLOW UP (OUTPATIENT)
Dept: URBAN - METROPOLITAN AREA CLINIC 29 | Facility: CLINIC | Age: 69
End: 2024-08-06

## 2024-08-06 DIAGNOSIS — H40.1112: ICD-10-CM

## 2024-08-06 DIAGNOSIS — H17.11: ICD-10-CM

## 2024-08-06 DIAGNOSIS — Z96.1: ICD-10-CM

## 2024-08-06 DIAGNOSIS — H18.613: ICD-10-CM

## 2024-08-06 DIAGNOSIS — H40.1123: ICD-10-CM

## 2024-08-06 DIAGNOSIS — H20.13: ICD-10-CM

## 2024-08-06 PROCEDURE — 99213 OFFICE O/P EST LOW 20 MIN: CPT

## 2024-08-06 ASSESSMENT — TONOMETRY
OD_IOP_MMHG: 12
OS_IOP_MMHG: 17

## 2024-08-06 ASSESSMENT — VISUAL ACUITY: OD_CC: 20/25 SCL
